# Patient Record
Sex: FEMALE | Race: WHITE | NOT HISPANIC OR LATINO | URBAN - METROPOLITAN AREA
[De-identification: names, ages, dates, MRNs, and addresses within clinical notes are randomized per-mention and may not be internally consistent; named-entity substitution may affect disease eponyms.]

---

## 2020-10-14 ENCOUNTER — WEB ENCOUNTER (OUTPATIENT)
Dept: URBAN - METROPOLITAN AREA CLINIC 128 | Facility: CLINIC | Age: 45
End: 2020-10-14

## 2020-10-14 ENCOUNTER — OFFICE VISIT (OUTPATIENT)
Dept: URBAN - METROPOLITAN AREA CLINIC 128 | Facility: CLINIC | Age: 45
End: 2020-10-14
Payer: COMMERCIAL

## 2020-10-14 ENCOUNTER — DASHBOARD ENCOUNTERS (OUTPATIENT)
Age: 45
End: 2020-10-14

## 2020-10-14 DIAGNOSIS — R19.7 DIARRHEA: ICD-10-CM

## 2020-10-14 DIAGNOSIS — R10.9 ABDOMINAL PAIN: ICD-10-CM

## 2020-10-14 PROCEDURE — 99204 OFFICE O/P NEW MOD 45 MIN: CPT | Performed by: PHYSICIAN ASSISTANT

## 2020-10-14 PROCEDURE — G8427 DOCREV CUR MEDS BY ELIG CLIN: HCPCS | Performed by: PHYSICIAN ASSISTANT

## 2020-10-14 PROCEDURE — G9903 PT SCRN TBCO ID AS NON USER: HCPCS | Performed by: PHYSICIAN ASSISTANT

## 2020-10-14 PROCEDURE — G8420 CALC BMI NORM PARAMETERS: HCPCS | Performed by: PHYSICIAN ASSISTANT

## 2020-10-14 RX ORDER — CHOLESTYRAMINE 4 G/9G
1 PACKET MIXED WITH WATER OR NON-CARBONATED DRINK POWDER, FOR SUSPENSION ORAL TWICE A DAY
Refills: 2 | OUTPATIENT
Start: 2020-10-14

## 2020-10-14 RX ORDER — DULOXETINE 60 MG/1
1 CAPSULE CAPSULE, DELAYED RELEASE PELLETS ORAL ONCE A DAY
Status: ACTIVE | COMMUNITY

## 2020-10-14 RX ORDER — POLYETHYLENE GLYOCOL 3350, SODIUM CHLORIDE, SODIUM BICARBONATE AND POTASSIUM CHLORIDE 420; 11.2; 5.72; 1.48 G/4L; G/4L; G/4L; G/4L
SPLIT DOSE REGIMEN: TAKE ONE HALF OF PREP PO AT 5PM AND TAKE THE OTHER HALF OF PREP PO AT 10PM POWDER, FOR SOLUTION NASOGASTRIC; ORAL
Qty: 1 BOTTLE | Refills: 0 | OUTPATIENT
Start: 2020-10-14

## 2020-10-14 NOTE — PHYSICAL EXAM GASTROINTESTINAL
Abdomen , soft, tenderness to palpation in the epigastric region was noted, nondistended , no guarding or rigidity , no masses palpable , normal bowel sounds, negative Dixon's sign noted Liver and Spleen , no hepatosplenomegaly Rectal deferred

## 2020-10-14 NOTE — HPI-TODAY'S VISIT:
The patient elicits having epigastric abdominal pain for 7 months. IT is worse after meals. She elicits having associated upper back pain and nausea with it. She denies heartburn, fever, diarrhea, constipation. She has been having 3 BMs a day and her normal pattern is only 1 BM a day. She states her stool floats, is malodorous, and is yellow in color. She elicits bright red blood in stool at times. SHe states her pcp ordered an abd/pelvic CT scan which was negative, records requested. She has started duloxetine 60mg in 08/20 for muscle twitching and balance issues she has been seen for by a neurologist and rheumatologist and states the pain started before this medication but thediarrhea started 6 weeks after starting the new duloxetine. She elicits having a red rash on her chest , hands, and elbows at times. 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

## 2020-10-16 LAB
A/G RATIO: 1.9
ALBUMIN: 4.7
ALKALINE PHOSPHATASE: 34
ALT (SGPT): 14
AST (SGOT): 14
BASO (ABSOLUTE): 0
BASOS: 0
BILIRUBIN, TOTAL: 0.8
BUN/CREATININE RATIO: 10
BUN: 11
C-REACTIVE PROTEIN, QUANT: 1
CALCIUM: 9.1
CARBON DIOXIDE, TOTAL: 23
CHLORIDE: 101
CREATININE: 1.12
DEAMIDATED GLIADIN ABS, IGA: 5
DEAMIDATED GLIADIN ABS, IGG: 3
EGFR IF AFRICN AM: 69
EGFR IF NONAFRICN AM: 60
ENDOMYSIAL ANTIBODY IGA: NEGATIVE
EOS (ABSOLUTE): 0
EOS: 0
GLOBULIN, TOTAL: 2.5
GLUCOSE: 85
HEMATOCRIT: 43.7
HEMATOLOGY COMMENTS:: (no result)
HEMOGLOBIN: 14.7
IMMATURE CELLS: (no result)
IMMATURE GRANS (ABS): 0
IMMATURE GRANULOCYTES: 0
IMMUNOGLOBULIN A, QN, SERUM: 114
LIPASE: 25
LYMPHS (ABSOLUTE): 3
LYMPHS: 31
MCH: 30.9
MCHC: 33.6
MCV: 92
MONOCYTES(ABSOLUTE): 0.5
MONOCYTES: 5
NEUTROPHILS (ABSOLUTE): 6.1
NEUTROPHILS: 64
NRBC: (no result)
PLATELETS: 272
POTASSIUM: 4.3
PROTEIN, TOTAL: 7.2
RBC: 4.76
RDW: 12
SODIUM: 140
T-TRANSGLUTAMINASE (TTG) IGA: <2
T-TRANSGLUTAMINASE (TTG) IGG: 2
TSH: 3.44
WBC: 9.7

## 2020-10-20 ENCOUNTER — LAB OUTSIDE AN ENCOUNTER (OUTPATIENT)
Dept: URBAN - METROPOLITAN AREA CLINIC 128 | Facility: CLINIC | Age: 45
End: 2020-10-20

## 2020-10-28 LAB
C DIFFICILE TOXINS A+B, EIA: NEGATIVE
CALPROTECTIN, FECAL: <16
CAMPYLOBACTER CULTURE: (no result)
E COLI SHIGA TOXIN EIA: NEGATIVE
FATS, NEUTRAL: (no result)
FATS, TOTAL: (no result)
GIARDIA LAMBLIA AG, EIA: NEGATIVE
Lab: (no result)
OVA + PARASITE EXAM: (no result)
PANCREATIC ELASTASE, FECAL: >500
SALMONELLA/SHIGELLA SCREEN: (no result)
WHITE BLOOD CELLS (WBC), STOOL: (no result)

## 2020-10-30 ENCOUNTER — TELEPHONE ENCOUNTER (OUTPATIENT)
Dept: URBAN - METROPOLITAN AREA CLINIC 92 | Facility: CLINIC | Age: 45
End: 2020-10-30

## 2020-11-10 ENCOUNTER — CLAIMS CREATED FROM THE CLAIM WINDOW (OUTPATIENT)
Dept: URBAN - METROPOLITAN AREA CLINIC 4 | Facility: CLINIC | Age: 45
End: 2020-11-10
Payer: COMMERCIAL

## 2020-11-10 ENCOUNTER — OFFICE VISIT (OUTPATIENT)
Dept: URBAN - METROPOLITAN AREA SURGERY CENTER 31 | Facility: SURGERY CENTER | Age: 45
End: 2020-11-10
Payer: COMMERCIAL

## 2020-11-10 DIAGNOSIS — R10.13 ABDOMINAL DISCOMFORT, EPIGASTRIC: ICD-10-CM

## 2020-11-10 DIAGNOSIS — K92.1 BLACK STOOL: ICD-10-CM

## 2020-11-10 DIAGNOSIS — K31.9 DISEASE OF STOMACH AND DUODENUM, UNSPECIFIED: ICD-10-CM

## 2020-11-10 DIAGNOSIS — K63.89 OTHER SPECIFIED DISEASES OF INTESTINE: ICD-10-CM

## 2020-11-10 DIAGNOSIS — K31.89 ACQUIRED DEFORMITY OF DUODENUM: ICD-10-CM

## 2020-11-10 DIAGNOSIS — K31.89 OTHER DISEASES OF STOMACH AND DUODENUM: ICD-10-CM

## 2020-11-10 DIAGNOSIS — K63.89 BACTERIAL OVERGROWTH SYNDROME: ICD-10-CM

## 2020-11-10 DIAGNOSIS — R19.7 ACUTE DIARRHEA: ICD-10-CM

## 2020-11-10 DIAGNOSIS — R10.84 ABDOMINAL CRAMPING, GENERALIZED: ICD-10-CM

## 2020-11-10 PROCEDURE — G9937 DIG OR SURV COLSCO: HCPCS | Performed by: INTERNAL MEDICINE

## 2020-11-10 PROCEDURE — 43239 EGD BIOPSY SINGLE/MULTIPLE: CPT | Performed by: INTERNAL MEDICINE

## 2020-11-10 PROCEDURE — 88313 SPECIAL STAINS GROUP 2: CPT | Performed by: PATHOLOGY

## 2020-11-10 PROCEDURE — 45380 COLONOSCOPY AND BIOPSY: CPT | Performed by: INTERNAL MEDICINE

## 2020-11-10 PROCEDURE — 88305 TISSUE EXAM BY PATHOLOGIST: CPT | Performed by: PATHOLOGY

## 2020-11-10 PROCEDURE — G8907 PT DOC NO EVENTS ON DISCHARG: HCPCS | Performed by: INTERNAL MEDICINE

## 2020-11-10 PROCEDURE — 88342 IMHCHEM/IMCYTCHM 1ST ANTB: CPT | Performed by: PATHOLOGY

## 2020-11-10 PROCEDURE — 88312 SPECIAL STAINS GROUP 1: CPT | Performed by: PATHOLOGY
